# Patient Record
Sex: FEMALE | Race: WHITE
[De-identification: names, ages, dates, MRNs, and addresses within clinical notes are randomized per-mention and may not be internally consistent; named-entity substitution may affect disease eponyms.]

---

## 2020-05-01 ENCOUNTER — HOSPITAL ENCOUNTER (EMERGENCY)
Dept: HOSPITAL 62 - ER | Age: 30
Discharge: HOME | End: 2020-05-01
Payer: SELF-PAY

## 2020-05-01 VITALS — SYSTOLIC BLOOD PRESSURE: 108 MMHG | DIASTOLIC BLOOD PRESSURE: 52 MMHG

## 2020-05-01 DIAGNOSIS — S43.401A: Primary | ICD-10-CM

## 2020-05-01 DIAGNOSIS — F17.200: ICD-10-CM
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
## 2020-05-01 DIAGNOSIS — S29.012A: ICD-10-CM

## 2020-05-01 PROCEDURE — 99283 EMERGENCY DEPT VISIT LOW MDM: CPT

## 2020-05-01 NOTE — ER DOCUMENT REPORT
HPI





- HPI


Time Seen by Provider: 05/01/20 12:15


Onset: Other - Last several days


Onset/Duration: Persistent


Quality of pain: Achy


Context: 





Patient presents complaining of right shoulder pain and right upper back pain.  

Patient states she works as a  and is frequently performing 

heavy lifting.  Patient denies any specific injury.  Patient is right-hand 

dominant.


Associated Symptoms: Other - Right shoulder joint pain.  denies: Fever, Headache


Exacerbated by: Movement


Relieved by: Denies


Similar symptoms previously: No


Recently seen / treated by doctor: No





- ROS


ROS below otherwise negative: Yes


Systems Reviewed and Negative: Yes All other systems reviewed and negative





- CONSTITUTIONAL


Constitutional: DENIES: Fever, Chills





- NEURO


Neurology: DENIES: Headache, Weakness





- RESPIRATORY


Respiratory: DENIES: Coughing





- GASTROINTESTINAL


Gastrointestinal: DENIES: Nausea





- MUSCULOSKELETAL


Musculoskeletal: REPORTS: Extremity pain, Back Pain





- DERM


Skin Color: Normal


Skin Problems: None





Past Medical History





- General


Information source: Patient





- Social History


Smoking Status: Current Every Day Smoker


Frequency of alcohol use: None


Drug Abuse: None


Occupation: 


Lives with: Family


Family History: Reviewed & Not Pertinent





- Medical History


Medical History: Negative


Renal/ Medical History: Denies: Hx Peritoneal Dialysis


Malignancy Medical History: Denies: Hx Bone Cancer, Hx Brain Cancer, Hx Breast 

Cancer, Hx Cervical Cancer, Hx Colorectal Cancer, Hx Leukemia, Hx Liver Cancer, 

Hx Lung Cancer, Hx Lymphoma, Hx Ovarian Cancer, Hx Pancreatic Cancer, Hx Renal 

(Kidney) Cancer, Hx Skin Cancer


Past Surgical History: Reports: Hx Hysterectomy





- Immunizations


Immunizations up to date: Yes


Hx Diphtheria, Pertussis, Tetanus Vaccination: Yes





Vertical Provider Document





- CONSTITUTIONAL


Agree With Documented VS: Yes


Exam Limitations: No Limitations


General Appearance: WD/WN, No Apparent Distress





- INFECTION CONTROL


TRAVEL OUTSIDE OF THE [place] IN LAST 30 DAYS: No





- HEENT


HEENT: Atraumatic, Normocephalic





- NECK


Neck: Normal Inspection, Supple.  negative: Lymphadenopathy-Left, 

Lymphadenopathy-Right





- RESPIRATORY


Respiratory: Breath Sounds Normal, No Respiratory Distress





- CARDIOVASCULAR


Cardiovascular: Regular Rate, Regular Rhythm


Pulses: Normal: Radial





- BACK


Back: Abnormal Inspection - Right trapezius back tenderness





- MUSCULOSKELETAL/EXTREMETIES


Musculoskeletal/Extremeties: MAEW, Tender - Right shoulder joint tenderness with

abduction and extension, no dislocation or deformity, No Edema.  negative: Eccy

mosis





- NEURO


Level of Consciousness: Awake, Alert, Appropriate


Motor/Sensory: No Motor Deficit





- DERM


Integumentary: Warm, Dry, No Rash





Procedures





- Immobilization


  ** Right Shoulder


Pre-Proc Neuro Vasc Exam: Normal


Immobilizer type: Shoulder immobilizer


Performed by: PCT


Post-Proc Neuro Vasc Exam: Normal


Alignment checked and good: Yes





Discharge





- Discharge


Clinical Impression: 


Sprain of right shoulder


Qualifiers:


 Encounter type: initial encounter Shoulder sprain type: unspecified sprain 

Qualified Code(s): S43.401A - Unspecified sprain of right shoulder joint, initia

l encounter





Trapezius strain


Qualifiers:


 Encounter type: initial encounter Laterality: right Qualified Code(s): S46.811A

- Strain of other muscles, fascia and tendons at shoulder and upper arm level, 

right arm, initial encounter





Condition: Stable


Disposition: HOME, SELF-CARE


Instructions:  Muscle Relaxers ([facility]), Muscle Strain ([facility]), Shoulder Injury 

([facility]), Temporary Sling ([facility])


Additional Instructions: 


Return immediately for any new or worsening symptoms





Followup with your primary care provider, call tomorrow to make a followup 

appointment





Follow-up with orthopedics for further management, call to make a follow-up 

appointment


Prescriptions: 


Cyclobenzaprine HCl [Flexeril 10 Mg Tablet] 10 mg PO TID #15 tablet


Lidocaine [Lidoderm 5% (700 mg) Transdermal Patch] 1 patch TP DAILY PRN #10 

adh..patch


 PRN Reason: 


Naproxen [Naprosyn 250 Nmg Tablet] 1 tab PO BID #14 tablet


Forms:  Restricted Release


Referrals: 


[id number] [Provider Group] - Follow up as needed